# Patient Record
Sex: MALE
[De-identification: names, ages, dates, MRNs, and addresses within clinical notes are randomized per-mention and may not be internally consistent; named-entity substitution may affect disease eponyms.]

---

## 2018-08-20 ENCOUNTER — TRANSCRIPTION ENCOUNTER (OUTPATIENT)
Age: 48
End: 2018-08-20

## 2022-01-05 PROBLEM — Z00.00 ENCOUNTER FOR PREVENTIVE HEALTH EXAMINATION: Status: ACTIVE | Noted: 2022-01-05

## 2023-01-23 ENCOUNTER — APPOINTMENT (OUTPATIENT)
Dept: ORTHOPEDIC SURGERY | Facility: CLINIC | Age: 53
End: 2023-01-23

## 2023-01-25 ENCOUNTER — APPOINTMENT (OUTPATIENT)
Dept: ORTHOPEDIC SURGERY | Facility: CLINIC | Age: 53
End: 2023-01-25

## 2023-02-08 ENCOUNTER — APPOINTMENT (OUTPATIENT)
Dept: ORTHOPEDIC SURGERY | Facility: CLINIC | Age: 53
End: 2023-02-08

## 2023-02-10 ENCOUNTER — APPOINTMENT (OUTPATIENT)
Dept: ORTHOPEDIC SURGERY | Facility: CLINIC | Age: 53
End: 2023-02-10
Payer: COMMERCIAL

## 2023-02-10 VITALS — WEIGHT: 183 LBS | BODY MASS INDEX: 29.41 KG/M2 | HEIGHT: 66 IN

## 2023-02-10 DIAGNOSIS — M77.12 LATERAL EPICONDYLITIS, LEFT ELBOW: ICD-10-CM

## 2023-02-10 DIAGNOSIS — M77.11 LATERAL EPICONDYLITIS, RIGHT ELBOW: ICD-10-CM

## 2023-02-10 PROCEDURE — 99204 OFFICE O/P NEW MOD 45 MIN: CPT

## 2023-02-10 NOTE — ASSESSMENT
[FreeTextEntry1] : Patient comes in with pain in both elbows. He said he thinks its from working out. 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  He says that a couple days ago he had significantly more pain now it is getting better.  The right side is worse than the left side.\par \par full active range of motion of the left shoulder, wrist and fingers\par full active range of motion of the left elbow\par Tenderness to palpation of the lateral epicondyle and proximal extensor supinator mass\par pain with resisted wrist extension and forearm supination\par 4/5 strength in supination and wrist extension, otherwise 5/5 strength\par median/ulnar/radial sensation intact to light touch\par ain/pin/ulnar motor intact\par palpable pulses CR<2s\par \par full active range of motion of the right shoulder, wrist and fingers\par full active range of motion of the right elbow\par Tenderness to palpation of the lateral epicondyle and proximal extensor supinator mass\par pain with resisted wrist extension and forearm supination\par 4/5 strength in supination and wrist extension, otherwise 5/5 strength\par median/ulnar/radial sensation intact to light touch\par ain/pin/ulnar motor intact\par palpable pulses CR<2s\par \par \par The patient was advised of the diagnosis. The natural history of the pathology was explained in full to the patient in layman's terms. All questions were answered. The risks and benefits of surgical and non-surgical treatment alternatives were explained in full to the patient. We discussed treatment options including nsaids, topical gels, tennis elbow strap, PT, activity modification, cortisone inj, sx .pt is aware that symptoms usually resolve on their own in 95-99% of people, but the timeframe is unknown. Home exercises/stretches were demonstrated and the patient practiced them as well- They are to do the exercises hourly and hold the stretch for 30 seconds. \par Avoid repetitive wrist flexion time was spent instructing the patient on appropriate placement of the elbow strap as well. \par   He was given a handout.  He will take anti-inflammatories as needed.  We discussed injections and hell they are only helpful for short   Term.  He is going to pay attention to how he is working out and hopefully keep his wrist in neutral for the majority of the time.  He will follow up if needed.

## 2023-02-24 ENCOUNTER — APPOINTMENT (OUTPATIENT)
Dept: ORTHOPEDIC SURGERY | Facility: CLINIC | Age: 53
End: 2023-02-24
Payer: COMMERCIAL

## 2023-02-24 PROCEDURE — 99213 OFFICE O/P EST LOW 20 MIN: CPT

## 2023-02-24 PROCEDURE — 73562 X-RAY EXAM OF KNEE 3: CPT | Mod: 50

## 2023-02-24 RX ORDER — MELOXICAM 15 MG/1
15 TABLET ORAL
Qty: 30 | Refills: 0 | Status: ACTIVE | COMMUNITY
Start: 2023-02-24 | End: 1900-01-01

## 2023-02-24 NOTE — HISTORY OF PRESENT ILLNESS
[de-identified] : Patient here for evaluation left knee pain.\par Denies instability\par Pain with ambulation and stairs\par \par h/o left knee arthroscopy\par \par NAD\par Left knee:\par No skin breakdown\par Tricompartmental TTP\par Positive patella grind\par PF crepitus\par Negative lachman\par Negative varus/valgus instability\par ROM 0-110\par Pain with forced extension and flexion\par NVI\par Compartments soft and NT\par \par Xray reviewed and significant for left knee arthritis, mild\par \par Plan\par went over findings\par explained the xrays\par will start pt\par script provided\par mobic sent\par fu in 2 months, consider injections vs mri

## 2023-04-27 ENCOUNTER — APPOINTMENT (OUTPATIENT)
Dept: ORTHOPEDIC SURGERY | Facility: CLINIC | Age: 53
End: 2023-04-27
Payer: COMMERCIAL

## 2023-04-27 DIAGNOSIS — M25.562 PAIN IN LEFT KNEE: ICD-10-CM

## 2023-04-27 PROCEDURE — 99213 OFFICE O/P EST LOW 20 MIN: CPT

## 2023-04-28 NOTE — HISTORY OF PRESENT ILLNESS
[de-identified] : Patient here for evaluation left knee pain.\par Denies instability\par Pain with ambulation and stairs\par \par h/o left knee arthroscopy\par \par Here for follow-up and he has been doing physical therapy and anti-inflammatory medications without any benefits.  Greater than 6 weeks in the last 3 months\par \par NAD\par Left knee:\par No skin breakdown\par Tricompartmental TTP\par Positive patella grind\par PF crepitus\par Negative lachman\par Negative varus/valgus instability\par ROM 0-110\par Pain with forced extension and flexion\par NVI\par Compartments soft and NT\par \par Xray reviewed and significant for left knee arthritis, mild, greater than 50% joint space\par \par Plan\par went over findings\par Since he is still having pain to the knee I would like to obtain an MRI as he has already undergone conservative management.  He will follow-up and see me a couple weeks to review the MRI to determine if there is any meniscus tear since he is already failed conservative management

## 2023-05-05 ENCOUNTER — APPOINTMENT (OUTPATIENT)
Dept: MRI IMAGING | Facility: CLINIC | Age: 53
End: 2023-05-05
Payer: COMMERCIAL

## 2023-05-05 PROCEDURE — 73721 MRI JNT OF LWR EXTRE W/O DYE: CPT | Mod: LT

## 2023-05-09 ENCOUNTER — APPOINTMENT (OUTPATIENT)
Dept: ORTHOPEDIC SURGERY | Facility: CLINIC | Age: 53
End: 2023-05-09
Payer: COMMERCIAL

## 2023-05-09 PROCEDURE — 99214 OFFICE O/P EST MOD 30 MIN: CPT | Mod: 25

## 2023-05-09 PROCEDURE — 20550 NJX 1 TENDON SHEATH/LIGAMENT: CPT | Mod: LT

## 2023-05-09 PROCEDURE — 73110 X-RAY EXAM OF WRIST: CPT | Mod: LT

## 2023-05-09 PROCEDURE — 76942 ECHO GUIDE FOR BIOPSY: CPT

## 2023-05-09 NOTE — ASSESSMENT
[FreeTextEntry1] : Comes in with complaint left wrist pain.  He says has been going on for about a month.  Nothing occurred.  He does not have other complaints.\par \par L wrist\par Mild swelling\par Tender first dorsal comp\par Decreased thumb and wrist ROM\par +Finklesteins\par \par X-rays are negative\par \par We reviewed the anatomy of the dorsal extensor compartment and pathology of deQuervain's tenosynovitis.  We discussed the treatment options including splinting/nsaids, injection and surgery.  We discussed that too many injections may lead to weakening o the tendon/tendon rupture and the safety of two injections. After a discussion of the risks, benefits and alternatives along with the expectations, the patient was amenable to injection.  The patient understands that it may take 2-5 days to see a noticeable difference.  Using ultrasound guidance, the 1st dorsal extensor compartment was visualized.  The area was then cleaned with Betadine and alcohol, sprayed with ethyl chloride, and an injection of 0.5 cc lidocaine and 2 cc of dexamethasone was injected into the 1st dorsal extensor compartment.  Sterile Band-Aid was applied.\par Patient opted for injection into the left first dorsal extensor compartment.  He tolerated the injection well.  He will follow-up back in a month.  If he is doing well he will cancel.

## 2023-05-16 ENCOUNTER — APPOINTMENT (OUTPATIENT)
Dept: ORTHOPEDIC SURGERY | Facility: CLINIC | Age: 53
End: 2023-05-16

## 2023-06-13 ENCOUNTER — APPOINTMENT (OUTPATIENT)
Dept: ORTHOPEDIC SURGERY | Facility: CLINIC | Age: 53
End: 2023-06-13
Payer: COMMERCIAL

## 2023-06-13 PROCEDURE — 76942 ECHO GUIDE FOR BIOPSY: CPT

## 2023-06-13 PROCEDURE — 99213 OFFICE O/P EST LOW 20 MIN: CPT | Mod: 25

## 2023-06-13 PROCEDURE — 20550 NJX 1 TENDON SHEATH/LIGAMENT: CPT

## 2023-06-13 NOTE — ASSESSMENT
[FreeTextEntry1] : The patient comes in for a follow up for deQuervain's. He is having pain in his wrist. The injection last about two days. He wants to get a MRI. He is training for a competition. He has been avoiding exercises that bother his wrist.  \par L wrist\par significant swelling\par Tender first dorsal comp\par Decreased thumb and wrist ROM\par +Finklesteins\par \par We reviewed the anatomy of the dorsal extensor compartment and pathology of deQuervain's tenosynovitis.  We discussed the treatment options including splinting/nsaids, injection and surgery.  We discussed that too many injections may lead to weakening o the tendon/tendon rupture and the safety of two injections. After a discussion of the risks, benefits and alternatives along with the expectations, the patient was amenable to injection.  The patient understands that it may take 2-5 days to see a noticeable difference.  Using ultrasound guidance, the 1st dorsal extensor compartment was visualized.  The area was then cleaned with Betadine and alcohol, sprayed with ethyl chloride, and an injection of 0.5 cc lidocaine and 1cc of dexamethasone and 1 cc of Kenalog was injected into the 1st dorsal extensor compartment.  Sterile Band-Aid was applied.\par Patient opted for his second  injection into the left first dorsal extensor compartment. He tolerated the injection well. He will follow-up after his competition.  I did discuss with him I believe that he is a good candidate for surgery.  He has significant tenosynovitis proximal to the first dorsal extensor compartment.  Patient states he is not ready to move forward with surgery as he has a competition.  He also like to get an MRI.  When he follows up after the competition we will have a discussion once again.  If he still would like to move forward with an MRI I am happy to do so.  I still believe he needs to move forward with surgical intervention.\par \par

## 2023-08-15 ENCOUNTER — APPOINTMENT (OUTPATIENT)
Dept: ORTHOPEDIC SURGERY | Facility: CLINIC | Age: 53
End: 2023-08-15

## 2023-11-08 ENCOUNTER — APPOINTMENT (OUTPATIENT)
Dept: ORTHOPEDIC SURGERY | Facility: CLINIC | Age: 53
End: 2023-11-08
Payer: COMMERCIAL

## 2023-11-08 DIAGNOSIS — M65.4 RADIAL STYLOID TENOSYNOVITIS [DE QUERVAIN]: ICD-10-CM

## 2023-11-08 PROCEDURE — 76881 US COMPL JOINT R-T W/IMG: CPT

## 2023-11-08 PROCEDURE — 20550 NJX 1 TENDON SHEATH/LIGAMENT: CPT | Mod: RT

## 2023-11-08 PROCEDURE — 99214 OFFICE O/P EST MOD 30 MIN: CPT | Mod: 25

## 2023-12-08 ENCOUNTER — APPOINTMENT (OUTPATIENT)
Dept: ORTHOPEDIC SURGERY | Facility: CLINIC | Age: 53
End: 2023-12-08

## 2024-10-08 ENCOUNTER — APPOINTMENT (OUTPATIENT)
Dept: ORTHOPEDIC SURGERY | Facility: CLINIC | Age: 54
End: 2024-10-08
Payer: MEDICAID

## 2024-10-08 ENCOUNTER — RESULT CHARGE (OUTPATIENT)
Age: 54
End: 2024-10-08

## 2024-10-08 DIAGNOSIS — M25.512 PAIN IN LEFT SHOULDER: ICD-10-CM

## 2024-10-08 PROCEDURE — 73030 X-RAY EXAM OF SHOULDER: CPT | Mod: LT

## 2024-10-08 PROCEDURE — 99213 OFFICE O/P EST LOW 20 MIN: CPT

## 2024-11-05 ENCOUNTER — APPOINTMENT (OUTPATIENT)
Dept: ORTHOPEDIC SURGERY | Facility: CLINIC | Age: 54
End: 2024-11-05
Payer: MEDICAID

## 2024-11-05 DIAGNOSIS — M25.512 PAIN IN LEFT SHOULDER: ICD-10-CM

## 2024-11-05 PROCEDURE — 99213 OFFICE O/P EST LOW 20 MIN: CPT
